# Patient Record
Sex: MALE | Race: WHITE | NOT HISPANIC OR LATINO | Employment: OTHER | URBAN - METROPOLITAN AREA
[De-identification: names, ages, dates, MRNs, and addresses within clinical notes are randomized per-mention and may not be internally consistent; named-entity substitution may affect disease eponyms.]

---

## 2023-02-18 ENCOUNTER — APPOINTMENT (OUTPATIENT)
Dept: MRI IMAGING | Facility: CLINIC | Age: 73
End: 2023-02-18
Attending: EMERGENCY MEDICINE
Payer: COMMERCIAL

## 2023-02-18 ENCOUNTER — HOSPITAL ENCOUNTER (EMERGENCY)
Facility: CLINIC | Age: 73
Discharge: HOME | End: 2023-02-18
Attending: EMERGENCY MEDICINE | Admitting: EMERGENCY MEDICINE
Payer: COMMERCIAL

## 2023-02-18 VITALS
OXYGEN SATURATION: 100 % | SYSTOLIC BLOOD PRESSURE: 141 MMHG | BODY MASS INDEX: 32.65 KG/M2 | HEART RATE: 90 BPM | TEMPERATURE: 98 F | HEIGHT: 67 IN | DIASTOLIC BLOOD PRESSURE: 90 MMHG | RESPIRATION RATE: 14 BRPM | WEIGHT: 208 LBS

## 2023-02-18 DIAGNOSIS — G51.0 BELL'S PALSY: ICD-10-CM

## 2023-02-18 PROBLEM — R55 SYNCOPE: Status: ACTIVE | Noted: 2020-12-02

## 2023-02-18 PROBLEM — K40.90 RIGHT INGUINAL HERNIA: Status: ACTIVE | Noted: 2020-11-25

## 2023-02-18 PROBLEM — E78.5 HYPERLIPIDEMIA: Status: ACTIVE | Noted: 2020-12-02

## 2023-02-18 LAB
ANION GAP SERPL CALCULATED.3IONS-SCNC: 11 MMOL/L (ref 7–15)
ATRIAL RATE - MUSE: 71 BPM
BUN SERPL-MCNC: 36.4 MG/DL (ref 8–23)
CALCIUM SERPL-MCNC: 9.1 MG/DL (ref 8.8–10.2)
CHLORIDE SERPL-SCNC: 101 MMOL/L (ref 98–107)
CREAT SERPL-MCNC: 2.36 MG/DL (ref 0.67–1.17)
DEPRECATED HCO3 PLAS-SCNC: 30 MMOL/L (ref 22–29)
DIASTOLIC BLOOD PRESSURE - MUSE: NORMAL MMHG
ERYTHROCYTE [DISTWIDTH] IN BLOOD BY AUTOMATED COUNT: 15 % (ref 10–15)
GFR SERPL CREATININE-BSD FRML MDRD: 28 ML/MIN/1.73M2
GLUCOSE SERPL-MCNC: 93 MG/DL (ref 70–99)
HCT VFR BLD AUTO: 36.6 % (ref 40–53)
HGB BLD-MCNC: 11.9 G/DL (ref 13.3–17.7)
HOLD SPECIMEN: NORMAL
INTERPRETATION ECG - MUSE: NORMAL
MCH RBC QN AUTO: 30.7 PG (ref 26.5–33)
MCHC RBC AUTO-ENTMCNC: 32.5 G/DL (ref 31.5–36.5)
MCV RBC AUTO: 95 FL (ref 78–100)
P AXIS - MUSE: 77 DEGREES
PLATELET # BLD AUTO: 235 10E3/UL (ref 150–450)
POTASSIUM SERPL-SCNC: 4.3 MMOL/L (ref 3.4–5.3)
PR INTERVAL - MUSE: 168 MS
QRS DURATION - MUSE: 110 MS
QT - MUSE: 408 MS
QTC - MUSE: 443 MS
R AXIS - MUSE: -53 DEGREES
RBC # BLD AUTO: 3.87 10E6/UL (ref 4.4–5.9)
SODIUM SERPL-SCNC: 142 MMOL/L (ref 136–145)
SYSTOLIC BLOOD PRESSURE - MUSE: NORMAL MMHG
T AXIS - MUSE: -43 DEGREES
VENTRICULAR RATE- MUSE: 71 BPM
WBC # BLD AUTO: 7.6 10E3/UL (ref 4–11)

## 2023-02-18 PROCEDURE — 85027 COMPLETE CBC AUTOMATED: CPT | Performed by: EMERGENCY MEDICINE

## 2023-02-18 PROCEDURE — 99285 EMERGENCY DEPT VISIT HI MDM: CPT | Mod: 25

## 2023-02-18 PROCEDURE — 80048 BASIC METABOLIC PNL TOTAL CA: CPT | Performed by: EMERGENCY MEDICINE

## 2023-02-18 PROCEDURE — A9585 GADOBUTROL INJECTION: HCPCS | Performed by: EMERGENCY MEDICINE

## 2023-02-18 PROCEDURE — 96360 HYDRATION IV INFUSION INIT: CPT | Mod: 59

## 2023-02-18 PROCEDURE — 255N000002 HC RX 255 OP 636: Performed by: EMERGENCY MEDICINE

## 2023-02-18 PROCEDURE — 36415 COLL VENOUS BLD VENIPUNCTURE: CPT | Performed by: EMERGENCY MEDICINE

## 2023-02-18 PROCEDURE — 70553 MRI BRAIN STEM W/O & W/DYE: CPT

## 2023-02-18 PROCEDURE — 258N000003 HC RX IP 258 OP 636: Performed by: EMERGENCY MEDICINE

## 2023-02-18 PROCEDURE — 93005 ELECTROCARDIOGRAM TRACING: CPT

## 2023-02-18 RX ORDER — SODIUM CHLORIDE 9 MG/ML
INJECTION, SOLUTION INTRAVENOUS CONTINUOUS
Status: DISCONTINUED | OUTPATIENT
Start: 2023-02-18 | End: 2023-02-19 | Stop reason: HOSPADM

## 2023-02-18 RX ORDER — GADOBUTROL 604.72 MG/ML
9 INJECTION INTRAVENOUS ONCE
Status: COMPLETED | OUTPATIENT
Start: 2023-02-18 | End: 2023-02-18

## 2023-02-18 RX ORDER — LISINOPRIL 5 MG/1
5 TABLET ORAL DAILY
COMMUNITY

## 2023-02-18 RX ORDER — CARVEDILOL 25 MG/1
25 TABLET ORAL 2 TIMES DAILY WITH MEALS
COMMUNITY

## 2023-02-18 RX ORDER — PREDNISONE 20 MG/1
60 TABLET ORAL DAILY
Qty: 21 TABLET | Refills: 0 | Status: SHIPPED | OUTPATIENT
Start: 2023-02-18 | End: 2023-02-25

## 2023-02-18 RX ORDER — FUROSEMIDE 80 MG
80 TABLET ORAL DAILY
COMMUNITY

## 2023-02-18 RX ADMIN — GADOBUTROL 9 ML: 604.72 INJECTION INTRAVENOUS at 11:18

## 2023-02-18 RX ADMIN — SODIUM CHLORIDE 1000 ML: 9 INJECTION, SOLUTION INTRAVENOUS at 11:05

## 2023-02-18 ASSESSMENT — ENCOUNTER SYMPTOMS
FACIAL ASYMMETRY: 1
SPEECH DIFFICULTY: 0

## 2023-02-18 ASSESSMENT — ACTIVITIES OF DAILY LIVING (ADL)
ADLS_ACUITY_SCORE: 35

## 2023-02-18 NOTE — ED NOTES
Left facial droop noted last Wednesday. Pt does have some slurring speech. Equal strength both UE and LE. No HA, No vision changes, no balance issues. Pt does repeat himself frequently.

## 2023-02-18 NOTE — ED PROVIDER NOTES
"    History     Chief Complaint:  Facial droop      The history is provided by the patient.      Mohan Saxena is a 72 year old male with a history of HFpEF who presents with facial droop. 2 days ago, the patient reports developing a left sided facial droop. He denies difficulty speaking or visual disturbances. He notes a family history of a stoke.  He has no other neurologic symptoms such as weakness in an arm or leg or difficulty walking.  No history of Lyme's disease or cold sores.  He does looked in the mirror and noticed that there was a slight droop on his face and thought he better come in.  There was a family history of stroke in his dad or grandfather.    Independent Historian: No independent historian     Review of External Notes: I reviewed his office visit from  [facility] on 11/23/2020.    ROS:  Review of Systems   Eyes: Negative for visual disturbance.   Neurological: Positive for facial asymmetry. Negative for speech difficulty.   All other systems reviewed and are negative.      Allergies:  The patient denies any active allergies.    Medications:    Amlodipine   Lipitor     Past Medical History:    HFpEF   CKD stage III   Hypercholesterolemia   Inguinal hernia   Hypertension   Kidney stones     Past Surgical History:    Hernia repair     Social History:  The patient arrived via private vehicle.   He is escorted by his significant other.     Physical Exam     Patient Vitals for the past 24 hrs:   BP Temp Temp src Pulse Resp SpO2 Height Weight   02/18/23 0910 (!) 151/89 98  F (36.7  C) Temporal 90 14 100 % 1.702 m (5' 7\") 94.3 kg (208 lb)        Physical Exam  Nursing note and vitals reviewed.    Constitutional:  Appears comfortable.    HENT:    Nose normal.  No discharge.      Oral mucosa is moist.  Eyes:    Conjunctivae are normal without injection.  Pupils are equal.  Cardiovascular:  Normal rate, regular rhythm with normal S1 and S2.      Normal heart sounds and peripheral pulses 2+ and " equal.       No murmur or star.  Pulmonary:  Effort normal and breath sounds clear to auscultation bilaterally.   GI:    Soft. No distension and no mass. No tenderness.   Musculoskeletal:  Normal range of motion. No extremity deformity.     No edema and no tenderness.    Neurological:   GCS 15. NIH stroke scale score 1. O X 3.  No sensory deficit. Normal strength in all extremities.   Normal finger to nose. No hand drift. No leg drift. Forehead muscles seem symmetric. Slight left facial droop. Left eyebrow raised at rest.  Visual fields full. EOMs intact. No diplopia. No facial droop. No slurred speech.   Comprehension and expression of speech is normal. Mental status and memory normal.   Skin:    Skin is warm and dry. No rash noted. No diaphoresis.      No erythema. No pallor.  No lesions.  Psychiatric:   Behavior is normal. Appropriate mood and affect.     Judgment and thought content normal.       Emergency Department Course   ECG  ECG taken at 0953, ECG read at 1015  Normal sinus rhythm with sinus arrhythmia   Left anterior fascicular block   Nonspecific T wave abnormality   Abnormal ECG   Rate 71 bpm. CO interval 168 ms. QRS duration 110 ms. QT/QTc 408/443 ms. P-R-T axes 77 -53 -43.    Imaging:  MR Brain w/o & w Contrast   Final Result   IMPRESSION:   1.  No acute intracranial process.   2.  Generalized brain atrophy and presumed microvascular ischemic changes as detailed above.              Report per radiology    Laboratory:  Labs Ordered and Resulted from Time of ED Arrival to Time of ED Departure   CBC WITH PLATELETS - Abnormal       Result Value    WBC Count 7.6      RBC Count 3.87 (*)     Hemoglobin 11.9 (*)     Hematocrit 36.6 (*)     MCV 95      MCH 30.7      MCHC 32.5      RDW 15.0      Platelet Count 235     BASIC METABOLIC PANEL - Abnormal    Sodium 142      Potassium 4.3      Chloride 101      Carbon Dioxide (CO2) 30 (*)     Anion Gap 11      Urea Nitrogen 36.4 (*)     Creatinine 2.36 (*)      Calcium 9.1      Glucose 93      GFR Estimate 28 (*)       Emergency Department Course & Assessments:         Interventions:  Medications   0.9% sodium chloride BOLUS (0 mLs Intravenous Stopped 2/18/23 1151)     Followed by   sodium chloride 0.9% infusion (has no administration in time range)   gadobutrol (GADAVIST) injection 9 mL (9 mLs Intravenous Given 2/18/23 1118)        Independent Interpretation (X-rays, CTs, rhythm strip):  N/A    Consultations/Discussion of Management or Tests:  N/A        Social Determinants of Health affecting care:  N/A    Assessments:  0940 I obtained history and examined the patient as noted above.   1144 I rechecked the patient and explained findings. We discussed plan for discharge and patient is in agreement with plan.     Disposition:  The patient was discharged to home.     Impression & Plan    CMS Diagnoses: None    Medical Decision Making:  Patient comes in with some mild left-sided facial droop.  His eyebrow is riding high on the left suggesting some weakness but he can raise his eyebrows normally.  The facial droop is fairly mild but despite the fact that it appears to be probably Bell's palsy with his family history and age, I did get an MRI which came back normal.  He has some chronic renal failure and the BUN was up quite a bit so he was dehydrated and was given a liter of fluids.  I am going to put him on prednisone.  Because he has a grade 2 Bell's, antivirals are not recommended.  I did talk to him about the fact there is a small risk that he could have some permanent facial droop but I think it small.  He will follow-up with his doctor in the next couple of weeks.  His eye closes normally so he does not need any medication or treatment for that.    Take the prednisone daily for 7 days.  Schedule follow-up in the clinic in about 1 to 2 weeks with your doctor for a recheck.    Diagnosis:    ICD-10-CM    1. Bell's palsy  G51.0            Discharge Medications:  New  Prescriptions    PREDNISONE (DELTASONE) 20 MG TABLET    Take 3 tablets (60 mg) by mouth daily for 7 days        Scribe Disclosure:  I, Zandra Peralta, am serving as a scribe at 9:47 AM on 2/18/2023 to document services personally performed by Jen Prabhakar MD based on my observations and the provider's statements to me.    2/18/2023   Jen Prabhakar MD Powell, Tracy Alan, MD  02/18/23 1200

## 2023-02-18 NOTE — DISCHARGE INSTRUCTIONS
Take the prednisone daily for 7 days.  Schedule follow-up in the clinic in about 1 to 2 weeks with your doctor for a recheck.

## 2023-03-26 ENCOUNTER — HEALTH MAINTENANCE LETTER (OUTPATIENT)
Age: 73
End: 2023-03-26

## 2024-05-26 ENCOUNTER — HEALTH MAINTENANCE LETTER (OUTPATIENT)
Age: 74
End: 2024-05-26

## 2025-06-14 ENCOUNTER — HEALTH MAINTENANCE LETTER (OUTPATIENT)
Age: 75
End: 2025-06-14